# Patient Record
Sex: MALE | Race: WHITE | NOT HISPANIC OR LATINO | Employment: UNEMPLOYED | URBAN - METROPOLITAN AREA
[De-identification: names, ages, dates, MRNs, and addresses within clinical notes are randomized per-mention and may not be internally consistent; named-entity substitution may affect disease eponyms.]

---

## 2024-01-17 VITALS
TEMPERATURE: 97.9 F | HEART RATE: 99 BPM | WEIGHT: 231.48 LBS | RESPIRATION RATE: 18 BRPM | DIASTOLIC BLOOD PRESSURE: 92 MMHG | OXYGEN SATURATION: 98 % | BODY MASS INDEX: 32.41 KG/M2 | HEIGHT: 71 IN | SYSTOLIC BLOOD PRESSURE: 142 MMHG

## 2024-01-17 PROCEDURE — 12011 RPR F/E/E/N/L/M 2.5 CM/<: CPT

## 2024-01-17 PROCEDURE — 99282 EMERGENCY DEPT VISIT SF MDM: CPT

## 2024-01-18 ENCOUNTER — HOSPITAL ENCOUNTER (EMERGENCY)
Facility: CLINIC | Age: 24
Discharge: HOME | End: 2024-01-18
Admitting: SPECIALIST/TECHNOLOGIST

## 2024-01-18 DIAGNOSIS — S01.81XA FACIAL LACERATION, INITIAL ENCOUNTER: ICD-10-CM

## 2024-01-18 DIAGNOSIS — M79.645 PAIN OF LEFT MIDDLE FINGER: ICD-10-CM

## 2024-01-18 ASSESSMENT — ACTIVITIES OF DAILY LIVING (ADL): ADLS_ACUITY_SCORE: 33

## 2024-01-18 NOTE — ED TRIAGE NOTES
Arrives to ED with c/o slip and fall taht occurred 6 hours PTA. Small lac above R eyebrow, bleeding controlled. Denies LOC. Denies thinners. Also endorses pain to L hand which pt caught self with. No obvious deformity. Endorses nausea without emesis.      Triage Assessment (Adult)       Row Name 01/17/24 9245          Triage Assessment    Airway WDL WDL        Respiratory WDL    Respiratory WDL WDL        Skin Circulation/Temperature WDL    Skin Circulation/Temperature WDL WDL        Cardiac WDL    Cardiac WDL WDL        Peripheral/Neurovascular WDL    Peripheral Neurovascular WDL WDL        Cognitive/Neuro/Behavioral WDL    Cognitive/Neuro/Behavioral WDL WDL

## 2024-01-18 NOTE — ED PROVIDER NOTES
Emergency Department Encounter   NAME: Delmi Espinoza ; AGE: 23 year old male ; YOB: 2000 ; MRN: [id number] ; PCP: No primary care provider on file.   ED PROVIDER: Monica Laguna PA-C    Evaluation Date & Time:   No admission date for patient encounter.    CHIEF COMPLAINT:  Facial Laceration        Impression and Plan   FINAL IMPRESSION:    ICD-10-CM    1. Facial laceration, initial encounter  S01.81XA       2. Pain of left middle finger  M79.645           MDM:  Delmi Espinoza is a 23 year old male with no significant PMH presenting to the emergency department for evaluation of a laceration just below his right eyebrow that occurred after he fell forward about 6 hours ago.  He states he was walking and tripped and fell forward causing the front side of his head to the ground. Denies any loss of consciousness.  He states he also reached out to catch himself with his left hand and has had some pain in his middle finger since.  He endorses occasional nausea since hitting his head, no episodes of emesis.  He denies any headaches, vision changes, slurred speech, or one-sided weakness.  He is not on any blood thinners.  He states he had an updated tetanus vaccine 2 months ago.    Vitals reviewed and unremarkable aside from a BP of 122/92. On exam he is resting comfortably, he is not ill-appearing. GCS 15. There is a 1 inch very superficial laceration present just below the right eyebrow on the lateral aspect that is not actively bleeding.  Differential diagnosis includes but not limited to simple laceration, muscle involvement, globe injury, concussion, intracranial hemorrhage, finger fracture, slip injury. NEXUS criteria was reviewed with the patient and it was determined that a CT head is not indicated at this time. He has not had any significant change in symptoms since the injury occurred 6 hours ago and he is overall very well appearing, alert, and interactive.  He does not feel that a CT head is  warranted today and we discussed concerning symptoms that warrant return to the emergency department for head imaging, he is understanding and agreeable to this plan. I offered x-rays for the left middle digit, however, the patient does not feel that these are indicated today. He has full ROM and strength of the left middle digit.  He is neurovascularly intact on the left side.  I think this is reasonable and recommended he follow-up with his primary care provider if he continues to have pain in his left middle finger in a week. It is possible he has also sustained a mild concussion today as well and he can follow up with his PCP if his symptoms continue to linger. His eye appears to be injury free, there is no bleeding or conjunctival or scleral injection. He has no vision changes or blurry vision. There is no swelling of the eyelid or pain or difficulty with extraocular movements. The laceration was irrigated with normal saline, no foreign bodies identified. Given how superficial the laceration is it was closed using Steri-Strips and sterile glue. The patient tolerated the procedure well. He can take tylenol and ibuprofen as needed for pain. I also recommended application of ice to the affected area to help with any swelling. I educated the patient on signs of secondary infection or return to the emergency department, he is understanding and agreeable to this plan.       Medical Decision Making    History:  Supplemental history from: Documented in chart  External Record(s) reviewed: Documented in chart    Work Up:  Chart documentation includes differential considered and any EKGs or imaging independently interpreted by provider, where specified.  In additional to work up documented, I considered the following work up: Documented in chart, if applicable.    External consultation:  Discussion of management with another provider: Documented in chart, if applicable    Complicating factors:  Care impacted by chronic  illness: N/A  Care affected by social determinants of health: N/A    Disposition considerations: Discharge. No recommendations on prescription strength medication(s). See documentation for any additional details.      ED COURSE:  11:19 PM I met and introduced myself to the patient. I gathered initial history and performed my physical exam. We discussed plan for initial workup.   12:41 PM I rechecked the patient and discussed results, discharge, follow up, and reasons to return to the ED.     At the conclusion of the encounter I discussed the results of all the tests and the disposition. The questions were answered. The patient or family acknowledged understanding and was agreeable with the care plan.        MEDICATIONS GIVEN IN THE EMERGENCY DEPARTMENT:  Medications - No data to display      NEW PRESCRIPTIONS STARTED AT TODAY'S ED VISIT:  There are no discharge medications for this patient.        HPI   Patient information was obtained from: Patient   Use of Intrepreter: Oumar Espinoza is a 23 year old male with no pertinent medical history who presents to the ED by walk in for evaluation of facial laceration.     The patient reports that he fell around 5 PM this evening, and hit his head on the floor. He slipped and fell walking out of his house. Patient reports that his head hurts, and that he tried to catch himself with his left hand when he fell, so now his left middle finger hurts. Patient feels nauseas, but has had no episodes of emesis. He has a headache, and rates this pain as a 5/10.     Patient has not taken anything for his pain. He did not lose consciousness, no dark vision or spots and is not on any blood thinners.       Medical History     No past medical history on file.    No past surgical history on file.    No family history on file.         No current outpatient medications on file.        Physical Exam     First Vitals:  Patient Vitals for the past 24 hrs:   BP Temp Temp src Pulse  "Resp SpO2 Height Weight   01/17/24 2256 (!) 142/92 97.9  F (36.6  C) Temporal 99 18 98 % 1.803 m (5' 11\") 105 kg (231 lb 7.7 oz)         PHYSICAL EXAM    General Appearance:  Alert, cooperative, no distress, appears stated age. Not ill appearing.   HENT: Normocephalic without obvious deformity, atraumatic. Mucous membranes moist.    Eyes: Conjunctiva clear, Lids normal. No discharge. There is a 1 cm horizontal very superficial laceration present under the lateral aspect of the right eyebrow.  This is not actively bleeding.  No evidence of foreign bodies.  The right globe is atraumatic.  No conjunctival or scleral injection.  No pain or difficulty with extraocular movements.  No swelling of the right eyelids.  Respiratory: No distress. Lungs clear to ausculation bilaterally. No wheezes, rhonchi or stridor  Cardiovascular: Regular rate and rhythm, no murmur. Normal cap refill. No peripheral edema  Musculoskeletal: Moving all extremities. No gross deformities  Integument: Warm, dry, no rashes or lesions  Neurologic: Alert and orientated x3. No focal deficits. Cranial nerves III through XII intact.  No facial asymmetry.  Sensation to light touch intact to face and all extremities.  Finger/nose/finger intact.  Negative pronator drift.  Intact straight leg raise.  5 out of 5 strength with , flexion extension at elbow joints, flexion at shoulder and hip joint against resistance.  Dorsiflexion and plantarflexion are intact.  Answering questions appropriately with normal speech.  No aphasia or dysarthria.  Following commands.  Intact visual fields.  No gait ataxia.  Psych: Normal mood and affect        Results     LAB:  All pertinent labs reviewed and interpreted  Labs Ordered and Resulted from Time of ED Arrival to Time of ED Departure - No data to display    RADIOLOGY:  No orders to display               PROCEDURES:  PROCEDURE: Laceration Repair   INDICATIONS: Laceration   PROCEDURE PROVIDER: Monica Laguna PA-C   SITE: " Right side eyebrow   TYPE/SIZE: simple, superficial, clean, and no foreign body visualized  1 inch   FUNCTIONAL ASSESSMENT: Distal sensation, circulation, motor, and tendon function intact   MEDICATION: none   PREPARATION: irrigation with Normal saline   DEBRIDEMENT: wound explored, no foreign body found   CLOSURE:  Steri strips and sterile glue were use to approximate wound edges              I, Jovanna Gonzalez, am serving as a scribe to document services personally performed by Monica Laguna PA-C, based on my observation and the provider's statements to me. I, Monica Laguna PA-C attest that Jovanna Gonzalez is acting in a scribe capacity, has observed my performance of the services and has documented them in accordance with my direction.       Monica Laguna PA-C   Emergency Medicine   [facility] EMERGENCY ROOM       Monica Laguna PA-C  01/18/24 0317

## 2024-01-18 NOTE — DISCHARGE INSTRUCTIONS
You were seen in the emergency today for a laceration above your right eye. The wound was cleaned and steri strips were placed over the cut to close it. Sterile surgical glue was then placed over top to keep the bandages in place. These will fall off on their own in 4-5 days, do not take them off. The bandage can get wet however I would not scrub at them.    Watch the area around your cut for growing redness, increasing swelling, pus drainage from the area, or development of a fever as those might be signs of infection.

## 2025-04-01 ENCOUNTER — MEDICAL CORRESPONDENCE (OUTPATIENT)
Dept: HEALTH INFORMATION MANAGEMENT | Facility: CLINIC | Age: 25
End: 2025-04-01
Payer: COMMERCIAL

## 2025-04-01 ENCOUNTER — TRANSFERRED RECORDS (OUTPATIENT)
Dept: HEALTH INFORMATION MANAGEMENT | Facility: CLINIC | Age: 25
End: 2025-04-01
Payer: COMMERCIAL

## 2025-04-02 ENCOUNTER — TRANSCRIBE ORDERS (OUTPATIENT)
Dept: OTHER | Age: 25
End: 2025-04-02

## 2025-04-02 DIAGNOSIS — E66.9 OBESITY: Primary | ICD-10-CM

## 2025-07-09 RX ORDER — NIRMATRELVIR AND RITONAVIR 300-100 MG
KIT ORAL
COMMUNITY
Start: 2024-08-09 | End: 2025-07-15

## 2025-07-09 NOTE — PROGRESS NOTES
"New Medical Weight Management Consult    PATIENT:  Delmi Espinoza   MRN:         [id number]   :         2000  ELYSE:         7/15/2025      Dear Physician No Ref-Primary,    I had the pleasure of seeing your patient, Delmi Espinoza. Full intake/assessment was done to determine barriers to weight loss success and develop a treatment plan. Delmi Espinoza is a 25 year old male interested in treatment of medical problems associated with excess weight. He has a height of 5' 11\", a weight of 278 lbs 0 oz, and the calculated Body mass index is 38.77 kg/m .    Assessment & Plan   Problem List Items Addressed This Visit       Class 2 severe obesity due to excess calories with serious comorbidity and body mass index (BMI) of 38.0 to 38.9 in adult (H) - Primary    7/15/2025 initial evaluation. Pt will start Wegovy titration. Discussed mechanism of action, common side effects, titration guidelines, and discussed risks for pancreatitis/gallbladder problems/gastroparesis/low sugars/MTC/MEN2. Medication handout given in AVS.  We reviewed recommendations for muscle conservation including eating three meals daily, good protein intake, and strength training.   Dietician appointment next month.         Relevant Medications    semaglutide-weight management (WEGOVY) 0.25 MG/0.5ML pen    Semaglutide-Weight Management (WEGOVY) 0.5 MG/0.5ML pen    Semaglutide-Weight Management (WEGOVY) 1 MG/0.5ML pen    Other Relevant Orders    CBC with Platelets [VBQ877]    CMP [LAB17]    Vitamin D Deficiency [GSR006]    Hemoglobin A1C [LAB90]    TSH with free T4 Reflex [[id number]]    Adult Sleep Eval & Management Referral    Adult Nutrition  Referral    Snores    Gastroesophageal reflux disease, unspecified whether esophagitis present    Discussed in clinic visit. Anticipate improvement with weight loss.          Relevant Medications    semaglutide-weight management (WEGOVY) 0.25 MG/0.5ML pen    Semaglutide-Weight Management " "(WEGOVY) 0.5 MG/0.5ML pen    Semaglutide-Weight Management (WEGOVY) 1 MG/0.5ML pen        PROGRAM OVERVIEW  Discussed options available through [facility].   Education provided on chronic disease management of obesity.   We reviewed our program's volumes, risks, and outcomes data. All questions were answered.     SURGICAL WEIGHT LOSS   Option presented given pt BMI and current comorbid conditions. No current interest.  Website for bariatric online information session provided.  Pt will review at home and we will discuss at our follow up visit. [email or web address]    MEDICATIONS:  We discussed healthy habits to assist with weight loss. We reviewed medications associated with weight gain. We discussed the role of pharmacological agents in the treatment of obesity and the \"off-label\" use of medications in this practice. We reviewed medication that may assist with weight loss. Indications, contraindications, risks/benefits, and potential side effects were discussed. Explained medications must always be used together with lifestyle changes. Reviewed rationale for long term use of pharmacotherapy in chronic disease management for obesity.      AOM Considerations:  Phentermine:    Topiramate:    GLP-1: Start Wegovy PA today.     Naltrexone: Option, craves sweets   Wellbutrin:   Metformin:    Contrave:  Qsymia:            PATIENT INSTRUCTIONS:  Labs ordered.  Please call [phone] to set up a lab appt.    Please view bariatric online info session:  [email or web address]  A Sleep Referral was ordered today. Please call [phone], if you don't hear from someone within 2 business days.    4. Start Wegovy (semaglutide)   0.25 mg once weekly for 4-8 weeks,   if tolerating increase to 0.5 mg weekly for 4-8 weeks,   if tolerating increase to 1 mg weekly    May use famotidine 20 mg twice daily as needed for nausea/heartburn when starting the medication.      Goals:  Great job " eating within 1 hour of waking! Prioritize protein with each meal.  Strive to drink 2 liters of water throughout the day.  Try to get some sort of exercise most days of the week, even if just for 5 minutes at a time when starting out. Strength train twice a week to preserve muscle.      Follow up:    Call [phone] to schedule next visit in October with Leila Gupta NP   Dietician appointment 8/4/25    66 minutes spent on the date of the encounter doing chart review, history and exam, review test results, counseling, developing plan of care, documentation, and further activities as noted above.  The longitudinal plan of care for the diagnosis(es)/condition(s) as documented were addressed during this visit. Due to the added complexity in care, I will continue to support Delmi in the subsequent management and with ongoing continuity of care.    [place] Zealander  used during visit.       Weight Related Co-morbidities:          I have the following health issues associated with obesity: None   I have the following symptoms associated with obesity: GERD. Upset stomach. Gastritis in the past.   Referred 4/2/25 by PCP    There is no problem list on file for this patient.      Weight History    Previously had weight loss surgery: no   Age pt became overweight:   1.5 years ago when he moved to the [place]   The following factors contributed to weight gain:    New food since [place] to the [place]   I have tried the following methods to lose weight:   Diet   My lowest weight since age 17 was: 53kg   My highest weight since age 17 was: Current weight   The most weight I have ever lost was: (lbs)    Family hx of obesity: None   Are you interested in learning about weight loss surgery if appropriate?  Yes   How has your weight changed over the last year?  Gained     Diet Recall     Wake Up: 5:20A   Breakfast 5:40A Scrambled eggs w/ tea   Lunch 3:30P soup or buckwheat w/ meat + salad   Supper 7-8P fish w/ pasta or  soup   Bedtime:   Snack between meals:   Snack in evening:    10p weekdays, 1A-3A weekdays   None   Sweets or milk in the middle of the night      Typical snacks:    Caloric beverages per day. What type:        Low jess/diet drinks:   Alcohol:   Foods you crave:    Tea, water, and juice       Sweets        Sleep History    How many hours do you sleep at night? 7   Do you think you have sleep apnea?  No   Do you snore so loudly some one can hear you through a closed door? Yes   Has anyone seen or heard you stop breathing during your sleep?  No   Do you often feel tired, fatigued, or sleepy during the day? No   Do you have a TV/Computer in your bedroom? No   Pt agreeable to sleep referral. Sleep referral placed today.      Eating Habits (Yes/No) (Never, Daily, Several Days, Weekly,Monthly)   Healthy Eater No      Eat fast food/take out? 2 times/week      Eat most meals in front of screens Yes      Skip meals No   Grazes all day No, rarely snacks      Snacks between meals. No      Eat most food at end of day. Yes      Eat in middle of night  Yes and will sometimes not remember it, 3 times/week      Eat  to prevent or correct low blood sugar. No      Eat to prevent GERD No      Constant Dieter No      I emotionally eat. (stressed, depressed, anxious, bored) Yes      Feel hungry all the time-even if I just have eaten. No      Feeling full is important to me. N/A      Finish all the food on my plate-even if already full. No      Eat/snack without noticing I'm eating. No      Trouble not eating junk if around  No, does not like American sweets but yes if Welsh sweets      Think about food all day. No      Who does the grocery shopping? Wife   Who does the cooking? Wife       Eating Disorder Screen    I binge eat No      I make myself vomit what I have eaten or use laxatives to get rid of food. No   I eat a large amount of food, like a loaf of bread, a box of cookies, a pint/quart of ice cream, all at once. No   I eat a  large amount of food even when I am not hungry. No   I eat alone because I feel embarrassed and do not want others to see how much I have eaten. No   I eat until I am uncomfortably full. No   I feel bad, disgusted, or guilty after I overeat.        Activity/Exercise History    How many hours of screen time do you have daily?    How many times a week are you active for the purpose of exercise? For how long? 0   What do you do for exercise?  nothing   What keeps you from being more active? Lack of time, busy with work and kids       Work/Social History Reviewed With Patient    Employment status is: Full time   Job is:    Is job active or sedentary? Sedentary   Marital status?    Who do you live with?  Wife and 1 child age 1   Do you have children? are they overweight?      Social History     Tobacco Use    Smoking status: Some Days     Types: Cigarettes    Smokeless tobacco: Never    Hookah on weekends, occasionally cigarettes  Denies THC use.  1 alcohol drink every 2-3 weeks    Mental Health History Reviewed With Patient    How does your weight effect your mental health    How do you handle stress?  What coping techniques do you use?    Do you see a therapist?   No   Would you like to be connected with a therapist? No       MEDICATIONS:   Current Outpatient Medications   Medication Sig Dispense Refill    semaglutide-weight management (WEGOVY) 0.25 MG/0.5ML pen Inject 0.5 mLs (0.25 mg) subcutaneously once a week. 2 mL 1    Semaglutide-Weight Management (WEGOVY) 0.5 MG/0.5ML pen Inject 0.5 mg subcutaneously once a week. 2 mL 1    Semaglutide-Weight Management (WEGOVY) 1 MG/0.5ML pen Inject 1 mg subcutaneously once a week. 2 mL 1       ALLERGIES:   No Known Allergies    ROS:    HEENT  H/O glaucoma:  no  Cardiovascular  CAD:   no  Palpitations:   no  HTN:    no  Gastrointestinal  GERD:   Yes, managed w/ drinking water  Constipation:   no  Liver Dz:   no  H/O Pancreatitis:  no  H/O Gastroparesis no  H/O  "Gallbladder Dz: no  Psychiatric  Anxiety:   no  Depression:  no  Bipolar:  no  H/O ETOH/Drug abuse: no  H/O eating disorder: Yes, anorexia over 5 years ago  Endocrine  PMH/FMH of MTC or MEN2:  no  Neurologic:  H/O seizures:   no  Headaches:  no  Memory Impairment:  no    H/O kidney stones:  no  Kidney disease:  no      LABS/RECORDS REVIEWED:  No results found for: \"A1C\", \"VITDT\", \"TSH\", \"NA\", \"POTASSIUM\", \"CHLORIDE\", \"CO2\", \"ANIONGAP\", \"GLC\", \"BUN\", \"CR\", \"GFRESTIMATED\", \"KASIA\", \"ALT\", \"AST\", \"CHOL\", \"HDL\", \"LDL\", \"TRIG\", \"HGB\"        BP Readings from Last 6 Encounters:   07/15/25 134/84   01/17/24 (!) 142/92       Pulse Readings from Last 6 Encounters:   07/15/25 90   01/17/24 99       PHYSICAL EXAM:  /84   Pulse 90   Ht 5' 11\" (1.803 m)   Wt 278 lb (126.1 kg)   SpO2 98%   BMI 38.77 kg/m    GENERAL: Healthy, alert and no distress  RESP: No audible wheeze, cough, or visible cyanosis.  No visible retractions or increased work of breathing.    SKIN: Visible skin clear. No significant rash, abnormal pigmentation or lesions.  PSYCH: Mentation appears normal, affect normal/bright, judgement and insight intact, normal speech and appearance well-groomed.    COUNSELING:   Reviewed obesity as a chronic disease and comprehensive management stratagies.      We discussed Bariatric Basics including:  -eating 3 meals daily  -eating protein first  -eating slowly, chewing food well  -avoiding/limiting calorie containing beverages  -limiting carbohydrates and changing to whole grains  -limiting restaurant or cafeteria eating to twice a week or less    We discussed the importance of restorative sleep and stress management in maintaining a healthy weight.  We discussed insulin resistance and glycemic index as it relates to appetite and weight control.   We discussed the importance of physical activity including cardiovascular and strength training in maintaining a healthier weight and explored viable options.  Patient " education of above written in AVS.      Sincerely,    Leila Gupta NP

## 2025-07-15 ENCOUNTER — TELEPHONE (OUTPATIENT)
Dept: SURGERY | Facility: CLINIC | Age: 25
End: 2025-07-15

## 2025-07-15 ENCOUNTER — OFFICE VISIT (OUTPATIENT)
Dept: SURGERY | Facility: CLINIC | Age: 25
End: 2025-07-15
Attending: FAMILY MEDICINE
Payer: COMMERCIAL

## 2025-07-15 ENCOUNTER — LAB (OUTPATIENT)
Dept: LAB | Facility: CLINIC | Age: 25
End: 2025-07-15
Payer: COMMERCIAL

## 2025-07-15 VITALS
WEIGHT: 278 LBS | BODY MASS INDEX: 38.92 KG/M2 | OXYGEN SATURATION: 98 % | SYSTOLIC BLOOD PRESSURE: 134 MMHG | DIASTOLIC BLOOD PRESSURE: 84 MMHG | HEIGHT: 71 IN | HEART RATE: 90 BPM

## 2025-07-15 DIAGNOSIS — E66.01 CLASS 2 SEVERE OBESITY DUE TO EXCESS CALORIES WITH SERIOUS COMORBIDITY AND BODY MASS INDEX (BMI) OF 38.0 TO 38.9 IN ADULT (H): ICD-10-CM

## 2025-07-15 DIAGNOSIS — K21.9 GASTROESOPHAGEAL REFLUX DISEASE, UNSPECIFIED WHETHER ESOPHAGITIS PRESENT: ICD-10-CM

## 2025-07-15 DIAGNOSIS — E66.01 CLASS 2 SEVERE OBESITY DUE TO EXCESS CALORIES WITH SERIOUS COMORBIDITY AND BODY MASS INDEX (BMI) OF 38.0 TO 38.9 IN ADULT (H): Primary | ICD-10-CM

## 2025-07-15 DIAGNOSIS — E66.812 CLASS 2 SEVERE OBESITY DUE TO EXCESS CALORIES WITH SERIOUS COMORBIDITY AND BODY MASS INDEX (BMI) OF 38.0 TO 38.9 IN ADULT (H): ICD-10-CM

## 2025-07-15 DIAGNOSIS — E66.812 CLASS 2 SEVERE OBESITY DUE TO EXCESS CALORIES WITH SERIOUS COMORBIDITY AND BODY MASS INDEX (BMI) OF 38.0 TO 38.9 IN ADULT (H): Primary | ICD-10-CM

## 2025-07-15 DIAGNOSIS — R06.83 SNORES: ICD-10-CM

## 2025-07-15 LAB
ALBUMIN SERPL BCG-MCNC: 4.7 G/DL (ref 3.5–5.2)
ALP SERPL-CCNC: 58 U/L (ref 40–150)
ALT SERPL W P-5'-P-CCNC: 87 U/L (ref 0–70)
ANION GAP SERPL CALCULATED.3IONS-SCNC: 10 MMOL/L (ref 7–15)
AST SERPL W P-5'-P-CCNC: 37 U/L (ref 0–45)
BILIRUB SERPL-MCNC: 1.1 MG/DL
BUN SERPL-MCNC: 16.3 MG/DL (ref 6–20)
CALCIUM SERPL-MCNC: 9.8 MG/DL (ref 8.8–10.4)
CHLORIDE SERPL-SCNC: 103 MMOL/L (ref 98–107)
CREAT SERPL-MCNC: 0.81 MG/DL (ref 0.67–1.17)
EGFRCR SERPLBLD CKD-EPI 2021: >90 ML/MIN/1.73M2
ERYTHROCYTE [DISTWIDTH] IN BLOOD BY AUTOMATED COUNT: 12.8 % (ref 10–15)
EST. AVERAGE GLUCOSE BLD GHB EST-MCNC: 97 MG/DL
GLUCOSE SERPL-MCNC: 109 MG/DL (ref 70–99)
HBA1C MFR BLD: 5 %
HCO3 SERPL-SCNC: 29 MMOL/L (ref 22–29)
HCT VFR BLD AUTO: 44.4 % (ref 40–53)
HGB BLD-MCNC: 15.7 G/DL (ref 13.3–17.7)
MCH RBC QN AUTO: 31.8 PG (ref 26.5–33)
MCHC RBC AUTO-ENTMCNC: 35.4 G/DL (ref 31.5–36.5)
MCV RBC AUTO: 90 FL (ref 78–100)
PLATELET # BLD AUTO: 244 10E3/UL (ref 150–450)
POTASSIUM SERPL-SCNC: 4 MMOL/L (ref 3.4–5.3)
PROT SERPL-MCNC: 7.6 G/DL (ref 6.4–8.3)
RBC # BLD AUTO: 4.93 10E6/UL (ref 4.4–5.9)
SODIUM SERPL-SCNC: 142 MMOL/L (ref 135–145)
TSH SERPL DL<=0.005 MIU/L-ACNC: 1.27 UIU/ML (ref 0.3–4.2)
VIT D+METAB SERPL-MCNC: 25 NG/ML (ref 20–50)
WBC # BLD AUTO: 8.2 10E3/UL (ref 4–11)

## 2025-07-15 PROCEDURE — 84443 ASSAY THYROID STIM HORMONE: CPT

## 2025-07-15 PROCEDURE — 36415 COLL VENOUS BLD VENIPUNCTURE: CPT

## 2025-07-15 PROCEDURE — 82247 BILIRUBIN TOTAL: CPT

## 2025-07-15 PROCEDURE — 3075F SYST BP GE 130 - 139MM HG: CPT | Performed by: NURSE PRACTITIONER

## 2025-07-15 PROCEDURE — 83036 HEMOGLOBIN GLYCOSYLATED A1C: CPT

## 2025-07-15 PROCEDURE — 99205 OFFICE O/P NEW HI 60 MIN: CPT | Performed by: NURSE PRACTITIONER

## 2025-07-15 PROCEDURE — 85014 HEMATOCRIT: CPT

## 2025-07-15 PROCEDURE — 82306 VITAMIN D 25 HYDROXY: CPT

## 2025-07-15 PROCEDURE — G2211 COMPLEX E/M VISIT ADD ON: HCPCS | Performed by: NURSE PRACTITIONER

## 2025-07-15 PROCEDURE — 3079F DIAST BP 80-89 MM HG: CPT | Performed by: NURSE PRACTITIONER

## 2025-07-15 NOTE — PATIENT INSTRUCTIONS
"It was nice to talk with you today! Below is the plan discussed.-  BARBARA Dee      Pt Instructions:  Labs ordered.  Please call [phone] to set up a lab appt.    Please view bariatric online info session:  [email or web address]  A Sleep Referral was ordered today. Please call [phone], if you don't hear from someone within 2 business days.    4. Start Wegovy (semaglutide)   0.25 mg once weekly for 4-8 weeks,   if tolerating increase to 0.5 mg weekly for 4-8 weeks,   if tolerating increase to 1 mg weekly    May use famotidine 20 mg twice daily as needed for nausea/heartburn when starting the medication.      Goals:  Great job eating within 1 hour of waking! Prioritize protein with each meal.  Strive to drink 2 liters of water throughout the day.  Try to get some sort of exercise most days of the week, even if just for 5 minutes at a time when starting out. Strength train twice a week to preserve muscle.      Follow up:    Call [phone] to schedule next visit in October with Leila Gupta, NP   Dietician appointment 8/4/25                            Obesity is a complex chronic disease    Obesity is a brain disease that causes dysregulation of our energy system.  It is not a character flaw it is a chemistry flaw.      It has many causes.  80% is genetic.  These can be influenced by factors like an altered microbiome (microbes in your gut), endocrine disrupting chemicals, sedentary lifestyle, low nutrient/ high calorie foods, and weight promoting medications.     Energy homeostasis is tightly regulated by the central nervous system. The hypothalamus is the primary center for the regulation of energy balance.  The thermostat is influenced by signals in response to fullness, hunger, and others. With obesity are several impairments in those signals to your hypothalamus.  It causes your thermostat \"set point\"  is be too high.      When you lose weight, you body's response it to defend its set " point. Signals will be sent to your hypothalamus to decrease your metabolism, increase hunger, and decrease feeling of fullness.  This ultimately drives your weight back to you set range.     Medications can help regulate those signals.  If you respond well to obesity medications, these should be used lifelong.  Otherwise, if removed, your body will go back to that dysregulated state and defend its set point.  (You will regain your weight.)    Obesity is a chronic disease. It is hard to treat but we are learning more every day.  Treatment is improving by leaps and bounds.  The best thing you can do is continue close follow up with your obesity medicine provider.  Together we can tackle this disease.      WEGOVY (semaglutide)      Wegovy (semaglutide) injection 2.4 mg is an injectable prescription medicine used for adults with obesity (BMI >=30) or overweight (excess weight) (BMI >=27) who also have weight-related medical problems to help them lose weight and keep the weight off.  It is a GLP-1 agonist medication. GLP1 agonists stimulate the hormone GLP-1 in your body to allow you to feel full quickly and stay full longer.    Directions:  Start Wegovy (semaglutide) 0.25 mg once weekly for 4 weeks, then if tolerating increase to 0.5 mg weekly for 4 weeks, then if tolerating increase to 1 mg weekly for 4 weeks, then if tolerating increase to 1.7 mg weekly for 4 weeks, then if tolerating increase to 2.4 mg weekly thereafter.      -Each Wegovy pen is a once weekly single-dose prefilled pen with a pen injector already built within the pen. Discard the Wegovy pen after use in sharps container.     Common Side Effects:    nausea, headache, diarrhea, stomach upset.  If these become unmanageable call or mychart.    Serious Side effects:   Pancreatitis (inflammation of the pancreas) has been associated with this type of medication, but is very rare.Symptoms of pancreatitis include: Pain in your upper stomach area which may  travel to your back and be worse after eating.     Tips to help with side effects:  For Nausea/Heartburn:  Eat small, protein focused meals throughout the day  Stay hydrated.-The medication can decrease your drive for thirst  Eat slowly. STOP at the first sign of satisfaction  Eat at regular intervals, letting hours pass without eating- can cause nausea.  AVOID foods that are high in fat and sugar .      For constipation:  Stay hydrated and make sure you are moving.    Miralax 1 scoop/packet up daily    Ducosate Sodium 1 pill twice daily (stool softener)    Storage:   Store the prescription in the refrigerator. Once it is time to use the Wegovy pen, you can keep the pen at room temperature and it is good for up to 28 days at room temperature.     How to inject:  For a video on how to use the pen please go to:  https://[email or web address]"/about-wegovy/how-to-use-the-wegovy-pen.html#itemTwo

## 2025-07-15 NOTE — TELEPHONE ENCOUNTER
"Prior Authorization Retail Medication Request    Medication/Dose: Wegovy 0.25 weekly    ICD code (if different than what is on RX):  [E66.812, E66.01, Z68.38]  [K21.9]     Previously Tried and Failed:  diet and exercise    Rationale:  weight management    Hx of obesity   Ht: 5'11\"  Body mass index is 38.77 kg/m  7/15/25  Current Weight:  278 lb (126.1 kg) 7/15/25      [facility]  [facility]   Insurance ID:  [id number]       Pharmacy Information (if different than what is on RX)  Name:  [id number] - [place], [place] - [address] [place] [address]   Phone:  [phone]     "

## 2025-07-15 NOTE — ASSESSMENT & PLAN NOTE
7/15/2025 initial evaluation. Pt will start Wegovy titration. Discussed mechanism of action, common side effects, titration guidelines, and discussed risks for pancreatitis/gallbladder problems/gastroparesis/low sugars/MTC/MEN2. Medication handout given in AVS.  We reviewed recommendations for muscle conservation including eating three meals daily, good protein intake, and strength training.   Dietician appointment next month.

## 2025-07-16 ENCOUNTER — PATIENT OUTREACH (OUTPATIENT)
Dept: CARE COORDINATION | Facility: CLINIC | Age: 25
End: 2025-07-16
Payer: COMMERCIAL

## 2025-07-19 NOTE — TELEPHONE ENCOUNTER
Retail Pharmacy Prior Authorization Team   Phone: [phone]    PA Initiation    Medication: WEGOVY 0.25 MG/0.5ML SC SOAJ  Insurance Company: [employer] - Phone [phone] Fax [phone]  Pharmacy Filling the Rx: [id number] - [address]  Filling Pharmacy Phone: [phone]  Filling Pharmacy Fax:    Start Date: 7/19/2025    ZAKI EVANGELISTA ([place]: [id number])

## 2025-07-20 ENCOUNTER — HEALTH MAINTENANCE LETTER (OUTPATIENT)
Age: 25
End: 2025-07-20

## 2025-07-21 NOTE — TELEPHONE ENCOUNTER
PRIOR AUTHORIZATION DENIED    Medication: WEGOVY 0.25 MG/0.5ML SC SOAJ  Insurance Company: [employer] - Phone [phone] Fax [phone]  Denial Date: 7/19/2025  Denial Reason(s): MUST TRY/FAIL PHENTERMINE FOR AT LEAST 3 MONTHS OR HAVE CONTRAINDICATION      Appeal Information: IF THE PROVIDER WOULD LIKE TO APPEAL THIS DECISION PLEASE PROVIDE THE PA TEAM WITH A LETTER OF MEDICAL NECESSITY      Patient Notified: NO

## 2025-08-04 ENCOUNTER — VIRTUAL VISIT (OUTPATIENT)
Dept: SURGERY | Facility: CLINIC | Age: 25
End: 2025-08-04
Attending: FAMILY MEDICINE
Payer: COMMERCIAL

## 2025-08-04 DIAGNOSIS — Z71.3 NUTRITIONAL COUNSELING: ICD-10-CM

## 2025-08-04 DIAGNOSIS — E66.812 OBESITY, CLASS II, BMI 35-39.9, ISOLATED (SEE ACTUAL BMI): Primary | ICD-10-CM

## 2025-08-04 PROCEDURE — 98001 SYNCH AUDIO-VIDEO NEW LOW 30: CPT | Performed by: DIETITIAN, REGISTERED
